# Patient Record
Sex: FEMALE | Race: WHITE
[De-identification: names, ages, dates, MRNs, and addresses within clinical notes are randomized per-mention and may not be internally consistent; named-entity substitution may affect disease eponyms.]

---

## 2018-02-21 ENCOUNTER — HOSPITAL ENCOUNTER (INPATIENT)
Dept: HOSPITAL 54 - ER | Age: 73
LOS: 2 days | Discharge: HOME | DRG: 309 | End: 2018-02-23
Attending: INTERNAL MEDICINE | Admitting: INTERNAL MEDICINE
Payer: MEDICARE

## 2018-02-21 VITALS — DIASTOLIC BLOOD PRESSURE: 64 MMHG | SYSTOLIC BLOOD PRESSURE: 134 MMHG

## 2018-02-21 VITALS — HEIGHT: 62 IN | WEIGHT: 119 LBS | BODY MASS INDEX: 21.9 KG/M2

## 2018-02-21 VITALS — SYSTOLIC BLOOD PRESSURE: 117 MMHG | DIASTOLIC BLOOD PRESSURE: 64 MMHG

## 2018-02-21 DIAGNOSIS — R00.1: ICD-10-CM

## 2018-02-21 DIAGNOSIS — E83.42: ICD-10-CM

## 2018-02-21 DIAGNOSIS — G90.8: ICD-10-CM

## 2018-02-21 DIAGNOSIS — D64.9: ICD-10-CM

## 2018-02-21 DIAGNOSIS — N39.0: ICD-10-CM

## 2018-02-21 DIAGNOSIS — I10: ICD-10-CM

## 2018-02-21 DIAGNOSIS — I48.0: Primary | ICD-10-CM

## 2018-02-21 DIAGNOSIS — E11.9: ICD-10-CM

## 2018-02-21 DIAGNOSIS — E78.5: ICD-10-CM

## 2018-02-21 LAB
ALBUMIN SERPL BCP-MCNC: 3.5 G/DL (ref 3.4–5)
ALP SERPL-CCNC: 64 U/L (ref 46–116)
ALT SERPL W P-5'-P-CCNC: 17 U/L (ref 12–78)
APTT PPP: 20 SEC (ref 23–34)
AST SERPL W P-5'-P-CCNC: 20 U/L (ref 15–37)
BASOPHILS # BLD AUTO: 0.1 /CMM (ref 0–0.2)
BASOPHILS NFR BLD AUTO: 0.8 % (ref 0–2)
BILIRUB DIRECT SERPL-MCNC: 0.1 MG/DL (ref 0–0.2)
BILIRUB SERPL-MCNC: 0.4 MG/DL (ref 0.2–1)
BUN SERPL-MCNC: 15 MG/DL (ref 7–18)
CALCIUM SERPL-MCNC: 9.5 MG/DL (ref 8.5–10.1)
CHLORIDE SERPL-SCNC: 106 MMOL/L (ref 98–107)
CO2 SERPL-SCNC: 25 MMOL/L (ref 21–32)
CREAT SERPL-MCNC: 0.8 MG/DL (ref 0.6–1.3)
D DIMER PPP FEU-MCNC: 1.31 MG/L(FEU (ref 0.17–0.5)
EOSINOPHIL # BLD AUTO: 0.1 /CMM (ref 0–0.7)
EOSINOPHIL NFR BLD AUTO: 0.6 % (ref 0–6)
GLUCOSE SERPL-MCNC: 119 MG/DL (ref 74–106)
HCT VFR BLD AUTO: 41 % (ref 33–45)
HGB BLD-MCNC: 14 G/DL (ref 11.5–14.8)
INR PPP: 0.95 (ref 0.85–1.15)
LYMPHOCYTES NFR BLD AUTO: 4.3 /CMM (ref 0.8–4.8)
LYMPHOCYTES NFR BLD AUTO: 42.4 % (ref 20–44)
MCH RBC QN AUTO: 31 PG (ref 26–33)
MCHC RBC AUTO-ENTMCNC: 34 G/DL (ref 31–36)
MCV RBC AUTO: 90 FL (ref 82–100)
MONOCYTES NFR BLD AUTO: 0.6 /CMM (ref 0.1–1.3)
MONOCYTES NFR BLD AUTO: 5.6 % (ref 2–12)
NEUTROPHILS # BLD AUTO: 5.1 /CMM (ref 1.8–8.9)
NEUTROPHILS NFR BLD AUTO: 50.6 % (ref 43–81)
PH UR STRIP: 5.5 [PH] (ref 5–8)
PLATELET # BLD AUTO: 293 /CMM (ref 150–450)
POTASSIUM SERPL-SCNC: 4.2 MMOL/L (ref 3.5–5.1)
PROT SERPL-MCNC: 7.5 G/DL (ref 6.4–8.2)
RBC # BLD AUTO: 4.59 MIL/UL (ref 4–5.2)
RBC #/AREA URNS HPF: (no result) /HPF (ref 0–2)
RDW COEFFICIENT OF VARIATION: 12.6 (ref 11.5–15)
SODIUM SERPL-SCNC: 139 MMOL/L (ref 136–145)
TROPONIN I SERPL-MCNC: < 0.017 NG/ML (ref 0–0.06)
UROBILINOGEN UR STRIP-MCNC: 0.2 EU/DL
WBC #/AREA URNS HPF: (no result) /HPF (ref 0–3)
WBC NRBC COR # BLD AUTO: 10.2 K/UL (ref 4.3–11)

## 2018-02-21 PROCEDURE — Z7610: HCPCS

## 2018-02-21 PROCEDURE — A4606 OXYGEN PROBE USED W OXIMETER: HCPCS

## 2018-02-21 RX ADMIN — ENOXAPARIN SODIUM SCH MG: 40 INJECTION SUBCUTANEOUS at 22:29

## 2018-02-21 RX ADMIN — SODIUM CHLORIDE PRN MLS/HR: 9 INJECTION, SOLUTION INTRAVENOUS at 22:29

## 2018-02-21 NOTE — NUR
PT AMBULATED WITH STEADY GAIT TO RESTROOM WITH GRANDSON TO GIVE URINE SPECIMEN. 
PT BACK TO BED AND PLACED BACK ON MONITOR AND POX. PT SAFETY AND COMFORT 
MEASURES IN PLACE.

## 2018-02-21 NOTE — NUR
UNABLE TO COMPLETED STANDING PORTION OF ORTHOSTATIC BLOOD PRESSURE D/T LOW BP, 
MD INFORMED AND AGREES TO HOLD OFF FOR NOW.

## 2018-02-21 NOTE — NUR
PATIENT BROUGHT BACK IMMEDIATELY, FAMILY TOLD CT TECH OVER PHONE TO HOLD OFF ON 
CTA UNTIL FAMILY ARRIVES TO BEDSIDE.

## 2018-02-21 NOTE — NUR
ms/rn notes



Received a patient from er, complaint of chest pain. Under epic group. Patient is alert et 
oriented. Respiratione even et unlabored. No sob noted.  Vital signs stable, afebrile. No 
complaints of chest pain . Lungs are clear upon auscultation. skin is intact, No skin 
breakdown noted. ambulates with steady gait. Sinus rhythm on cardiac monitor, Hr- 69. No 
acute distress noted. Oriented too room and call light.  instructed patient to call for 
assist . Verbalize understanding. All safety precautions rendered. Will continue to monitor.

## 2018-02-21 NOTE — NUR
PATIENT PRESENTS TO ER C/O CHEST PAIN SINCE THIS AM. HAD SYNCOPAL EPISODE AT ER 
WAITING ROOM.  PATIENT IS CURRENTLY AWAKE AND ALERT X 3. BREATHING EVEN AND 
UNLABORED. NO SOB, NAD.  PATIENT IS HYPOTENSIVE, ASYMPTOMATIC AT THIS TIME.  
SAFETY AND COMFORT MEASURES IN PLACE. AWAITING MD ORDERS.

## 2018-02-22 VITALS — SYSTOLIC BLOOD PRESSURE: 150 MMHG | DIASTOLIC BLOOD PRESSURE: 75 MMHG

## 2018-02-22 VITALS — DIASTOLIC BLOOD PRESSURE: 64 MMHG | SYSTOLIC BLOOD PRESSURE: 117 MMHG

## 2018-02-22 VITALS — SYSTOLIC BLOOD PRESSURE: 147 MMHG | DIASTOLIC BLOOD PRESSURE: 72 MMHG

## 2018-02-22 VITALS — DIASTOLIC BLOOD PRESSURE: 85 MMHG | SYSTOLIC BLOOD PRESSURE: 141 MMHG

## 2018-02-22 VITALS — DIASTOLIC BLOOD PRESSURE: 76 MMHG | SYSTOLIC BLOOD PRESSURE: 144 MMHG

## 2018-02-22 VITALS — DIASTOLIC BLOOD PRESSURE: 62 MMHG | SYSTOLIC BLOOD PRESSURE: 123 MMHG

## 2018-02-22 LAB
BASOPHILS # BLD AUTO: 0 /CMM (ref 0–0.2)
BASOPHILS NFR BLD AUTO: 0.3 % (ref 0–2)
BUN SERPL-MCNC: 10 MG/DL (ref 7–18)
CALCIUM SERPL-MCNC: 9.1 MG/DL (ref 8.5–10.1)
CHLORIDE SERPL-SCNC: 109 MMOL/L (ref 98–107)
CHOLEST SERPL-MCNC: 248 MG/DL (ref ?–200)
CO2 SERPL-SCNC: 27 MMOL/L (ref 21–32)
CREAT SERPL-MCNC: 0.6 MG/DL (ref 0.6–1.3)
EOSINOPHIL # BLD AUTO: 0.1 /CMM (ref 0–0.7)
EOSINOPHIL NFR BLD AUTO: 0.8 % (ref 0–6)
FERRITIN SERPL-MCNC: 50 NG/ML (ref 8–388)
GLUCOSE SERPL-MCNC: 89 MG/DL (ref 74–106)
HCT VFR BLD AUTO: 34 % (ref 33–45)
HDLC SERPL-MCNC: 44 MG/DL (ref 40–60)
HGB BLD-MCNC: 11.6 G/DL (ref 11.5–14.8)
IRON SERPL-MCNC: 105 UG/DL (ref 50–175)
LDLC SERPL DIRECT ASSAY-MCNC: 176 MG/DL (ref 0–99)
LYMPHOCYTES NFR BLD AUTO: 3.6 /CMM (ref 0.8–4.8)
LYMPHOCYTES NFR BLD AUTO: 43.3 % (ref 20–44)
MAGNESIUM SERPL-MCNC: 1.7 MG/DL (ref 1.8–2.4)
MCH RBC QN AUTO: 32 PG (ref 26–33)
MCHC RBC AUTO-ENTMCNC: 34 G/DL (ref 31–36)
MCV RBC AUTO: 92 FL (ref 82–100)
MONOCYTES NFR BLD AUTO: 0.5 /CMM (ref 0.1–1.3)
MONOCYTES NFR BLD AUTO: 5.9 % (ref 2–12)
NEUTROPHILS # BLD AUTO: 4.1 /CMM (ref 1.8–8.9)
NEUTROPHILS NFR BLD AUTO: 49.7 % (ref 43–81)
NT-PROBNP SERPL-MCNC: 509 PG/ML (ref 0–125)
PHOSPHATE SERPL-MCNC: 3.9 MG/DL (ref 2.5–4.9)
PLATELET # BLD AUTO: 230 /CMM (ref 150–450)
POTASSIUM SERPL-SCNC: 4.5 MMOL/L (ref 3.5–5.1)
RBC # BLD AUTO: 3.65 MIL/UL (ref 4–5.2)
RDW COEFFICIENT OF VARIATION: 13.7 (ref 11.5–15)
SODIUM SERPL-SCNC: 142 MMOL/L (ref 136–145)
TIBC SERPL-MCNC: 259 UG/DL (ref 250–450)
TRIGL SERPL-MCNC: 146 MG/DL (ref 30–150)
TSH SERPL DL<=0.005 MIU/L-ACNC: 3.27 UIU/ML (ref 0.36–3.74)
TSH SERPL DL<=0.005 MIU/L-ACNC: 3.52 UIU/ML (ref 0.36–3.74)
WBC NRBC COR # BLD AUTO: 8.3 K/UL (ref 4.3–11)

## 2018-02-22 RX ADMIN — EZETIMIBE SCH MG: 10 TABLET ORAL at 12:12

## 2018-02-22 RX ADMIN — SODIUM CHLORIDE PRN MLS/HR: 9 INJECTION, SOLUTION INTRAVENOUS at 12:28

## 2018-02-22 RX ADMIN — PANTOPRAZOLE SODIUM SCH MG: 40 TABLET, DELAYED RELEASE ORAL at 12:13

## 2018-02-22 RX ADMIN — MAGNESIUM SULFATE IN DEXTROSE SCH MLS/HR: 10 INJECTION, SOLUTION INTRAVENOUS at 12:12

## 2018-02-22 RX ADMIN — LISINOPRIL SCH MG: 10 TABLET ORAL at 12:12

## 2018-02-22 RX ADMIN — MAGNESIUM SULFATE IN DEXTROSE SCH MLS/HR: 10 INJECTION, SOLUTION INTRAVENOUS at 13:58

## 2018-02-22 RX ADMIN — Medication SCH EACH: at 13:58

## 2018-02-22 RX ADMIN — ENOXAPARIN SODIUM SCH MG: 40 INJECTION SUBCUTANEOUS at 22:17

## 2018-02-22 RX ADMIN — ASPIRIN 81 MG SCH MG: 81 TABLET ORAL at 12:12

## 2018-02-22 NOTE — NUR
MS RN INITIAL NOTES



PT IN BED, ALERT ET ORIENTED. RESPIRATIONS EVEN ET UNLABORED. NO SOB NOTED.   COMFORTABLY, 
NO APPARENT DISTRESS NOTED. NO COMPLAINTS OF DISCOMFORT. ADMINISTERED . WITH IVF INFUSING 
WELL. SINUS RHYTHM ON CARDIAC MONITOR, HR- 60.   NO INFILTRATION NOTED. CALL LIGHT WITHIN 
REACH. KEPT CLEAN AND COMFORTABLE,  NEEDS ALL ATTENDED. . WILL CONT TO MONITOR.

## 2018-02-22 NOTE — NUR
MS RN CLOSING NOTES



PT IN BED, ALERT ET ORIENTED. RESPIRATIONS EVEN ET UNLABORED. NO SOB NOTED.  VITAL SIGNS 
STABLE, AFEBRILE.RESTING COMFORTABLY, NO APPARENT DISTRESS NOTED. NO COMPLAINTS CHEST PAIN 
THE ENTIRE SHIFT. ADMINISTERED ALL MEDS AS ORDERED. WITH IVF INFUSING WELL. SINUS RHYTHM ON 
CARDIAC MONITOR, HR- 67.   NO INFILTRATION NOTED. CALL LIGHT WITHIN REACH. KEPT CLEAN AND 
COMFORTABLE,  NEEDS ALL ATTENDED. . WILL ENDORSE ACCORDINGLY FOR CONTINUITY OF CARE.

## 2018-02-23 VITALS — SYSTOLIC BLOOD PRESSURE: 132 MMHG | DIASTOLIC BLOOD PRESSURE: 85 MMHG

## 2018-02-23 VITALS — SYSTOLIC BLOOD PRESSURE: 132 MMHG | DIASTOLIC BLOOD PRESSURE: 83 MMHG

## 2018-02-23 VITALS — SYSTOLIC BLOOD PRESSURE: 131 MMHG | DIASTOLIC BLOOD PRESSURE: 80 MMHG

## 2018-02-23 VITALS — DIASTOLIC BLOOD PRESSURE: 85 MMHG | SYSTOLIC BLOOD PRESSURE: 141 MMHG

## 2018-02-23 LAB
ALBUMIN SERPL BCP-MCNC: 3.3 G/DL (ref 3.4–5)
ALP SERPL-CCNC: 53 U/L (ref 46–116)
ALT SERPL W P-5'-P-CCNC: 20 U/L (ref 12–78)
AST SERPL W P-5'-P-CCNC: 21 U/L (ref 15–37)
BASOPHILS # BLD AUTO: 0 /CMM (ref 0–0.2)
BASOPHILS NFR BLD AUTO: 0.4 % (ref 0–2)
BILIRUB SERPL-MCNC: 0.4 MG/DL (ref 0.2–1)
BUN SERPL-MCNC: 13 MG/DL (ref 7–18)
CALCIUM SERPL-MCNC: 8.6 MG/DL (ref 8.5–10.1)
CHLORIDE SERPL-SCNC: 106 MMOL/L (ref 98–107)
CO2 SERPL-SCNC: 27 MMOL/L (ref 21–32)
CREAT SERPL-MCNC: 0.7 MG/DL (ref 0.6–1.3)
EOSINOPHIL # BLD AUTO: 0.1 /CMM (ref 0–0.7)
EOSINOPHIL NFR BLD AUTO: 1.4 % (ref 0–6)
GLUCOSE SERPL-MCNC: 82 MG/DL (ref 74–106)
HCT VFR BLD AUTO: 34 % (ref 33–45)
HGB BLD-MCNC: 11.7 G/DL (ref 11.5–14.8)
LYMPHOCYTES NFR BLD AUTO: 3.4 /CMM (ref 0.8–4.8)
LYMPHOCYTES NFR BLD AUTO: 42.4 % (ref 20–44)
MAGNESIUM SERPL-MCNC: 1.8 MG/DL (ref 1.8–2.4)
MCH RBC QN AUTO: 32 PG (ref 26–33)
MCHC RBC AUTO-ENTMCNC: 34 G/DL (ref 31–36)
MCV RBC AUTO: 93 FL (ref 82–100)
MONOCYTES NFR BLD AUTO: 0.5 /CMM (ref 0.1–1.3)
MONOCYTES NFR BLD AUTO: 6.1 % (ref 2–12)
NEUTROPHILS # BLD AUTO: 4 /CMM (ref 1.8–8.9)
NEUTROPHILS NFR BLD AUTO: 49.7 % (ref 43–81)
PHOSPHATE SERPL-MCNC: 4.2 MG/DL (ref 2.5–4.9)
PLATELET # BLD AUTO: 220 /CMM (ref 150–450)
POTASSIUM SERPL-SCNC: 3.9 MMOL/L (ref 3.5–5.1)
PROT SERPL-MCNC: 7 G/DL (ref 6.4–8.2)
RBC # BLD AUTO: 3.68 MIL/UL (ref 4–5.2)
RDW COEFFICIENT OF VARIATION: 13.2 (ref 11.5–15)
SODIUM SERPL-SCNC: 141 MMOL/L (ref 136–145)
TROPONIN I SERPL-MCNC: < 0.017 NG/ML (ref 0–0.06)
WBC NRBC COR # BLD AUTO: 8 K/UL (ref 4.3–11)

## 2018-02-23 RX ADMIN — SODIUM CHLORIDE PRN MLS/HR: 9 INJECTION, SOLUTION INTRAVENOUS at 05:55

## 2018-02-23 RX ADMIN — EZETIMIBE SCH MG: 10 TABLET ORAL at 08:28

## 2018-02-23 RX ADMIN — PANTOPRAZOLE SODIUM SCH MG: 40 TABLET, DELAYED RELEASE ORAL at 08:27

## 2018-02-23 RX ADMIN — ASPIRIN 81 MG SCH MG: 81 TABLET ORAL at 08:27

## 2018-02-23 RX ADMIN — LISINOPRIL SCH MG: 10 TABLET ORAL at 08:28

## 2018-02-23 RX ADMIN — Medication SCH EACH: at 08:39

## 2018-02-23 NOTE — NUR
RN NOTES 

RECEIVED PT ON BED, A/Ox4, RESPIRATION EVEN AND UNLABORED, ON RA , NO SOB NOTED, ON TELE SR 
HR IN 80'S , NS AT 75CC/HR RUNNING VIA L HAND IV SITE G 20 , NO COMPLICATION NOTED AT THE IV 
 SITE, PT DENVER ANY CHEST PAIN AT THIS TIME , SR UP x3, CALL LIGHT WITHIN EASY REACH, BED 
LOCKED AND IN LOWEST POSITION , CONTINUE TO MONITOR.

## 2018-02-23 NOTE — NUR
RN NOTES 

DISCHARGE INSTRUCTION GIVEN TO PT AND HER DAUGHTER , TOSHIA , VERBALIZED UNDERSTANDING,  
VSS STABLE , H/L REMOVED , NO SKIN INSURE NOTED, PT LEFT THE FLOOR  VIA W/C TO MAIN ENTRANCE 
ACCOMPANIED BY STAFF MEMBER  IN STABLE CONDITION .

## 2018-02-23 NOTE — NUR
MS RN CLOSING NOTES



PT IN BED, ALERT ET ORIENTED. RESPIRATIONS EVEN ET UNLABORED. NO SOB NOTED.   COMFORTABLY, 
NO APPARENT DISTRESS NOTED. NO COMPLAINTS OF DISCOMFORT. ADMINISTERED . WITH IVF INFUSING 
WELL. SINUS RHYTHM ON CARDIAC MONITOR, HR- 60.   NO INFILTRATION NOTED. CALL LIGHT WITHIN 
REACH. KEPT CLEAN AND COMFORTABLE,  NEEDS ALL ATTENDED. . WILL CONT TO MONITOR.

## 2018-07-10 ENCOUNTER — HOSPITAL ENCOUNTER (EMERGENCY)
Dept: HOSPITAL 54 - ER | Age: 73
Discharge: HOME | End: 2018-07-10
Payer: MEDICARE

## 2018-07-10 VITALS — HEIGHT: 64 IN | BODY MASS INDEX: 29.02 KG/M2 | WEIGHT: 170 LBS

## 2018-07-10 VITALS — DIASTOLIC BLOOD PRESSURE: 18 MMHG | SYSTOLIC BLOOD PRESSURE: 105 MMHG

## 2018-07-10 DIAGNOSIS — Z79.82: ICD-10-CM
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
## 2018-07-10 DIAGNOSIS — Z79.899: ICD-10-CM

## 2018-07-10 DIAGNOSIS — I48.91: ICD-10-CM
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
## 2018-07-10 DIAGNOSIS — I10: ICD-10-CM

## 2018-07-10 DIAGNOSIS — S30.0XXA: Primary | ICD-10-CM

## 2018-07-10 DIAGNOSIS — Z79.84: ICD-10-CM

## 2018-07-10 DIAGNOSIS — E11.9: ICD-10-CM
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
## 2018-07-10 PROCEDURE — 72170 X-RAY EXAM OF PELVIS: CPT

## 2018-07-10 PROCEDURE — 99283 EMERGENCY DEPT VISIT LOW MDM: CPT

## 2018-07-10 PROCEDURE — Z7610: HCPCS

## 2018-07-10 PROCEDURE — A4606 OXYGEN PROBE USED W OXIMETER: HCPCS

## 2018-07-10 NOTE — NUR
Patient discharged to home in stable condition. Written and verbal after care 
instructions given. Patient verbalizes understanding of instruction. PT 
AMBULATED OUT WITH A STEADY GAIT. VSS

## 2019-04-10 ENCOUNTER — OFFICE (OUTPATIENT)
Dept: URBAN - METROPOLITAN AREA CLINIC 57 | Facility: CLINIC | Age: 74
End: 2019-04-10

## 2019-04-10 VITALS
SYSTOLIC BLOOD PRESSURE: 132 MMHG | TEMPERATURE: 98.7 F | HEIGHT: 65 IN | HEART RATE: 79 BPM | WEIGHT: 189 LBS | DIASTOLIC BLOOD PRESSURE: 85 MMHG | RESPIRATION RATE: 46 BRPM

## 2019-04-10 DIAGNOSIS — E78.5 HYPERLIPIDEMIA, UNSPECIFIED: ICD-10-CM

## 2019-04-10 DIAGNOSIS — E11.9 TYPE 2 DIABETES MELLITUS: ICD-10-CM

## 2019-04-10 DIAGNOSIS — I10 HYPERTENSION: ICD-10-CM

## 2019-04-10 DIAGNOSIS — Z12.11 SCREENING FOR COLON CANCER: ICD-10-CM

## 2019-04-10 DIAGNOSIS — K59.09 CHRONIC CONSTIPATION: ICD-10-CM

## 2019-04-10 PROCEDURE — 82272 OCCULT BLD FECES 1-3 TESTS: CPT | Performed by: INTERNAL MEDICINE

## 2019-04-10 PROCEDURE — 99204 OFFICE O/P NEW MOD 45 MIN: CPT | Performed by: INTERNAL MEDICINE

## 2019-04-10 RX ORDER — LINACLOTIDE 72 UG/1
72 CAPSULE, GELATIN COATED ORAL
Qty: 30 | Status: ACTIVE

## 2019-04-10 NOTE — SERVICEHPINOTES
The patient is a 73-year-old woman who comes in today accompanied by her daughter. The reason for her visit is constipation. The patient's native language is [place]ian and she speaks only very little English. Her daughter acts as . Nonetheless she suffers from severe constipation. She does not remember the laxatives which she has tried. Nonetheless she was recently given Linzess 72 µg. When she takes this intermittently it helps. However if she takes daily it does not seem to help. She has not had any recent colonoscopy. She did have recently a CBC, chem panel and TSH all of which were normal. She denies any rectal bleeding, melena, diarrhea, fever, chills, nausea or vomiting. She describes her stools as [place] stool scale #1. She does suffer from allergic rhinitis, back pain, diabetes, reflux, hyperlipidemia, hypertension and migraines for which she takes a number of medications.

## 2019-04-30 ENCOUNTER — AMBULATORY SURGICAL CENTER (OUTPATIENT)
Dept: URBAN - METROPOLITAN AREA SURGERY 34 | Facility: SURGERY | Age: 74
End: 2019-04-30

## 2019-04-30 VITALS
HEIGHT: 65 IN | HEART RATE: 53 BPM | TEMPERATURE: 98.9 F | RESPIRATION RATE: 16 BRPM | DIASTOLIC BLOOD PRESSURE: 73 MMHG | SYSTOLIC BLOOD PRESSURE: 138 MMHG | OXYGEN SATURATION: 97 % | WEIGHT: 180 LBS

## 2019-04-30 DIAGNOSIS — D12.5 BENIGN NEOPLASM OF SIGMOID COLON: ICD-10-CM

## 2019-04-30 DIAGNOSIS — K57.30 DVRTCLOS OF LG INT W/O PERFORATION OR ABSCESS W/O BLEEDING: ICD-10-CM

## 2019-04-30 DIAGNOSIS — D12.0 BENIGN NEOPLASM OF CECUM: ICD-10-CM

## 2019-04-30 PROCEDURE — 45385 COLONOSCOPY W/LESION REMOVAL: CPT | Performed by: INTERNAL MEDICINE

## 2019-04-30 PROCEDURE — 45380 COLONOSCOPY AND BIOPSY: CPT | Mod: 59 | Performed by: INTERNAL MEDICINE

## 2023-04-21 ENCOUNTER — HOSPITAL ENCOUNTER (EMERGENCY)
Dept: HOSPITAL 54 - ER | Age: 78
Discharge: HOME | End: 2023-04-21
Payer: MEDICARE

## 2023-04-21 VITALS — DIASTOLIC BLOOD PRESSURE: 73 MMHG | SYSTOLIC BLOOD PRESSURE: 145 MMHG

## 2023-04-21 VITALS — HEIGHT: 65 IN | BODY MASS INDEX: 29.99 KG/M2 | WEIGHT: 180 LBS

## 2023-04-21 DIAGNOSIS — Z79.82: ICD-10-CM

## 2023-04-21 DIAGNOSIS — E11.9: ICD-10-CM

## 2023-04-21 DIAGNOSIS — R42: Primary | ICD-10-CM

## 2023-04-21 DIAGNOSIS — Z79.899: ICD-10-CM

## 2023-04-21 DIAGNOSIS — I48.91: ICD-10-CM

## 2023-04-21 DIAGNOSIS — I10: ICD-10-CM

## 2023-04-21 DIAGNOSIS — Z98.890: ICD-10-CM

## 2023-04-21 LAB
ALBUMIN SERPL BCP-MCNC: 3.8 G/DL (ref 3.4–5)
ALP SERPL-CCNC: 54 U/L (ref 46–116)
ALT SERPL W P-5'-P-CCNC: 14 U/L (ref 12–78)
AST SERPL W P-5'-P-CCNC: 23 U/L (ref 15–37)
BASOPHILS # BLD AUTO: 0 K/UL (ref 0–0.2)
BASOPHILS NFR BLD AUTO: 0.5 % (ref 0–2)
BILIRUB DIRECT SERPL-MCNC: 0.1 MG/DL (ref 0–0.2)
BILIRUB SERPL-MCNC: 0.4 MG/DL (ref 0.2–1)
BUN SERPL-MCNC: 16 MG/DL (ref 7–18)
CALCIUM SERPL-MCNC: 9.6 MG/DL (ref 8.5–10.1)
CHLORIDE SERPL-SCNC: 104 MMOL/L (ref 98–107)
CO2 SERPL-SCNC: 28 MMOL/L (ref 21–32)
CREAT SERPL-MCNC: 0.6 MG/DL (ref 0.6–1.3)
EOSINOPHIL NFR BLD AUTO: 1.1 % (ref 0–6)
GLUCOSE SERPL-MCNC: 99 MG/DL (ref 74–106)
HCT VFR BLD AUTO: 38 % (ref 33–45)
HGB BLD-MCNC: 12.7 G/DL (ref 11.5–14.8)
LYMPHOCYTES NFR BLD AUTO: 2.7 K/UL (ref 0.8–4.8)
LYMPHOCYTES NFR BLD AUTO: 37.4 % (ref 20–44)
MCHC RBC AUTO-ENTMCNC: 33 G/DL (ref 31–36)
MCV RBC AUTO: 92 FL (ref 82–100)
MONOCYTES NFR BLD AUTO: 0.4 K/UL (ref 0.1–1.3)
MONOCYTES NFR BLD AUTO: 6 % (ref 2–12)
NEUTROPHILS # BLD AUTO: 3.9 K/UL (ref 1.8–8.9)
NEUTROPHILS NFR BLD AUTO: 55 % (ref 43–81)
PLATELET # BLD AUTO: 240 K/UL (ref 150–450)
POTASSIUM SERPL-SCNC: 4.1 MMOL/L (ref 3.5–5.1)
PROT SERPL-MCNC: 7.7 G/DL (ref 6.4–8.2)
RBC # BLD AUTO: 4.16 MIL/UL (ref 4–5.2)
SODIUM SERPL-SCNC: 137 MMOL/L (ref 136–145)
WBC NRBC COR # BLD AUTO: 7.1 K/UL (ref 4.3–11)

## 2023-04-21 PROCEDURE — 84484 ASSAY OF TROPONIN QUANT: CPT

## 2023-04-21 PROCEDURE — 80048 BASIC METABOLIC PNL TOTAL CA: CPT

## 2023-04-21 PROCEDURE — 82962 GLUCOSE BLOOD TEST: CPT

## 2023-04-21 PROCEDURE — 93005 ELECTROCARDIOGRAM TRACING: CPT

## 2023-04-21 PROCEDURE — 83880 ASSAY OF NATRIURETIC PEPTIDE: CPT

## 2023-04-21 PROCEDURE — 36415 COLL VENOUS BLD VENIPUNCTURE: CPT

## 2023-04-21 PROCEDURE — 80076 HEPATIC FUNCTION PANEL: CPT

## 2023-04-21 PROCEDURE — 71045 X-RAY EXAM CHEST 1 VIEW: CPT

## 2023-04-21 PROCEDURE — 99285 EMERGENCY DEPT VISIT HI MDM: CPT

## 2023-04-21 PROCEDURE — 85025 COMPLETE CBC W/AUTO DIFF WBC: CPT

## 2023-04-21 PROCEDURE — 70450 CT HEAD/BRAIN W/O DYE: CPT

## 2023-04-21 NOTE — NUR
RECEIVED PT 77YRS FEMALE CAME FROM HOME BY MARILEE C/O DIZZNESS AND WEAKNESS NO 
weekness no diffecity

## 2023-11-13 ENCOUNTER — HOSPITAL ENCOUNTER (INPATIENT)
Dept: HOSPITAL 12 - ER | Age: 78
LOS: 2 days | Discharge: HOME | DRG: 310 | End: 2023-11-15
Admitting: INTERNAL MEDICINE
Payer: MEDICARE

## 2023-11-13 VITALS — SYSTOLIC BLOOD PRESSURE: 129 MMHG | TEMPERATURE: 98.1 F | OXYGEN SATURATION: 98 % | DIASTOLIC BLOOD PRESSURE: 79 MMHG

## 2023-11-13 VITALS — WEIGHT: 177 LBS | BODY MASS INDEX: 32.57 KG/M2 | HEIGHT: 62 IN

## 2023-11-13 DIAGNOSIS — I50.9: ICD-10-CM

## 2023-11-13 DIAGNOSIS — E66.9: ICD-10-CM

## 2023-11-13 DIAGNOSIS — I35.0: ICD-10-CM

## 2023-11-13 DIAGNOSIS — I48.0: Primary | ICD-10-CM

## 2023-11-13 DIAGNOSIS — E78.5: ICD-10-CM

## 2023-11-13 DIAGNOSIS — M19.90: ICD-10-CM

## 2023-11-13 DIAGNOSIS — R94.6: ICD-10-CM

## 2023-11-13 DIAGNOSIS — Z90.710: ICD-10-CM

## 2023-11-13 DIAGNOSIS — I25.10: ICD-10-CM

## 2023-11-13 DIAGNOSIS — I11.0: ICD-10-CM

## 2023-11-13 DIAGNOSIS — E11.9: ICD-10-CM

## 2023-11-13 LAB
ALBUMIN SERPL BCG-MCNC: 3.7 G/DL (ref 3.4–5)
ALP SERPL-CCNC: 62 U/L (ref 50–136)
ALT SERPL W/O P-5'-P-CCNC: 31 U/L (ref 14–59)
AST SERPL-CCNC: 22 U/L (ref 15–37)
BASOPHILS # BLD AUTO: 0.1 K/UL (ref 0–0.2)
BASOPHILS NFR BLD AUTO: 0.6 % (ref 0–2)
BILIRUB DIRECT SERPL-MCNC: 0.1 MG/DL (ref 0–0.2)
BILIRUB SERPL-MCNC: 0.2 MG/DL (ref 0.2–1)
BUN SERPL-MCNC: 23 MG/DL (ref 7–18)
CALCIUM SERPL-MCNC: 9.4 MG/DL (ref 8.5–10.1)
CHLORIDE SERPL-SCNC: 105 MMOL/L (ref 98–107)
CO2 SERPL-SCNC: 22 MMOL/L (ref 21–32)
CREAT SERPL-MCNC: 0.7 MG/DL (ref 0.6–1.3)
EOSINOPHIL # BLD AUTO: 0.1 K/UL (ref 0–0.7)
EOSINOPHIL NFR BLD AUTO: 0.6 % (ref 0–7)
ERYTHROCYTE [DISTWIDTH] IN BLOOD BY AUTOMATED COUNT: 14.1 % (ref 12.3–17.7)
GLUCOSE SERPL-MCNC: 116 MG/DL (ref 74–106)
HCT VFR BLD AUTO: 36.3 % (ref 31.2–41.9)
HGB BLD-MCNC: 12.4 G/DL (ref 10.9–14.3)
LYMPHOCYTES # BLD AUTO: 4.6 K/UL (ref 0.8–4.8)
LYMPHOCYTES NFR BLD AUTO: 45.5 % (ref 20.5–51.5)
MANUAL DIF COMMENT BLD-IMP: 1
MCH RBC QN AUTO: 32.2 UUG (ref 24.7–32.8)
MCHC RBC AUTO-ENTMCNC: 34 G/DL (ref 32.3–35.6)
MCV RBC AUTO: 94.1 FL (ref 75.5–95.3)
MONOCYTES # BLD AUTO: 0.7 K/UL (ref 0.1–1.3)
MONOCYTES NFR BLD AUTO: 6.6 % (ref 0–11)
NEUTROPHILS # BLD AUTO: 4.7 K/UL (ref 1.8–8.9)
NEUTROPHILS NFR BLD AUTO: 46.7 % (ref 38.5–71.5)
NT-PROBNP SERPL-MCNC: 2784 PG/ML (ref 0–125)
PLATELET # BLD AUTO: 240 K/UL (ref 179–408)
POTASSIUM SERPL-SCNC: 4.1 MMOL/L (ref 3.5–5.1)
RBC # BLD AUTO: 3.86 MIL/UL (ref 3.63–4.92)
SODIUM SERPL-SCNC: 141 MMOL/L (ref 136–145)
WBC # BLD AUTO: 10.1 K/UL (ref 3.8–11.8)
WS STN SPEC: 7.3 G/DL (ref 6.4–8.2)

## 2023-11-13 PROCEDURE — A4663 DIALYSIS BLOOD PRESSURE CUFF: HCPCS

## 2023-11-13 PROCEDURE — G0378 HOSPITAL OBSERVATION PER HR: HCPCS

## 2023-11-13 PROCEDURE — A4606 OXYGEN PROBE USED W OXIMETER: HCPCS

## 2023-11-13 RX ADMIN — DILTIAZEM HYDROCHLORIDE SCH MG: 30 TABLET, FILM COATED ORAL at 23:06

## 2023-11-13 RX ADMIN — APIXABAN SCH MG: 5 TABLET, FILM COATED ORAL at 21:56

## 2023-11-14 VITALS — DIASTOLIC BLOOD PRESSURE: 72 MMHG | OXYGEN SATURATION: 95 % | TEMPERATURE: 98 F | SYSTOLIC BLOOD PRESSURE: 128 MMHG

## 2023-11-14 VITALS — TEMPERATURE: 98.1 F | DIASTOLIC BLOOD PRESSURE: 57 MMHG | SYSTOLIC BLOOD PRESSURE: 115 MMHG | OXYGEN SATURATION: 97 %

## 2023-11-14 VITALS — SYSTOLIC BLOOD PRESSURE: 112 MMHG | TEMPERATURE: 98.1 F | DIASTOLIC BLOOD PRESSURE: 64 MMHG | OXYGEN SATURATION: 95 %

## 2023-11-14 VITALS — OXYGEN SATURATION: 98 % | TEMPERATURE: 97.5 F | SYSTOLIC BLOOD PRESSURE: 120 MMHG | DIASTOLIC BLOOD PRESSURE: 66 MMHG

## 2023-11-14 VITALS — TEMPERATURE: 98.5 F

## 2023-11-14 LAB
BASOPHILS # BLD AUTO: 0 K/UL (ref 0–0.2)
BASOPHILS NFR BLD AUTO: 0.4 % (ref 0–2)
BUN SERPL-MCNC: 16 MG/DL (ref 7–18)
CALCIUM SERPL-MCNC: 9.3 MG/DL (ref 8.5–10.1)
CHLORIDE SERPL-SCNC: 105 MMOL/L (ref 98–107)
CHOLEST SERPL-MCNC: 301 MG/DL (ref ?–200)
CO2 SERPL-SCNC: 29 MMOL/L (ref 21–32)
CREAT SERPL-MCNC: 0.7 MG/DL (ref 0.6–1.3)
EOSINOPHIL # BLD AUTO: 0.1 K/UL (ref 0–0.7)
EOSINOPHIL NFR BLD AUTO: 1.5 % (ref 0–7)
ERYTHROCYTE [DISTWIDTH] IN BLOOD BY AUTOMATED COUNT: 13.8 % (ref 12.3–17.7)
GLUCOSE SERPL-MCNC: 97 MG/DL (ref 74–106)
HCT VFR BLD AUTO: 36.6 % (ref 31.2–41.9)
HDLC SERPL-MCNC: 46 MG/DL (ref 40–60)
HGB BLD-MCNC: 12.5 G/DL (ref 10.9–14.3)
LYMPHOCYTES # BLD AUTO: 3.3 K/UL (ref 0.8–4.8)
LYMPHOCYTES NFR BLD AUTO: 46.9 % (ref 20.5–51.5)
MAGNESIUM SERPL-MCNC: 1.8 MG/DL (ref 1.8–2.4)
MANUAL DIF COMMENT BLD-IMP: 1
MCH RBC QN AUTO: 32.3 UUG (ref 24.7–32.8)
MCHC RBC AUTO-ENTMCNC: 34 G/DL (ref 32.3–35.6)
MCV RBC AUTO: 94.4 FL (ref 75.5–95.3)
MONOCYTES # BLD AUTO: 0.4 K/UL (ref 0.1–1.3)
MONOCYTES NFR BLD AUTO: 6.1 % (ref 0–11)
NEUTROPHILS # BLD AUTO: 3.2 K/UL (ref 1.8–8.9)
NEUTROPHILS NFR BLD AUTO: 45.1 % (ref 38.5–71.5)
PHOSPHATE SERPL-MCNC: 3.6 MG/DL (ref 2.5–4.9)
PLATELET # BLD AUTO: 221 K/UL (ref 179–408)
POTASSIUM SERPL-SCNC: 4.2 MMOL/L (ref 3.5–5.1)
RBC # BLD AUTO: 3.88 MIL/UL (ref 3.63–4.92)
SODIUM SERPL-SCNC: 140 MMOL/L (ref 136–145)
TRIGL SERPL-MCNC: 219 MG/DL (ref 30–150)
TSH SERPL DL<=0.005 MIU/L-ACNC: 5.2 MIU/ML (ref 0.36–3.74)
WBC # BLD AUTO: 7.1 K/UL (ref 3.8–11.8)

## 2023-11-14 RX ADMIN — DILTIAZEM HYDROCHLORIDE SCH MG: 60 TABLET, FILM COATED ORAL at 08:36

## 2023-11-14 RX ADMIN — DILTIAZEM HYDROCHLORIDE SCH MG: 60 TABLET, FILM COATED ORAL at 17:02

## 2023-11-14 RX ADMIN — APIXABAN SCH MG: 5 TABLET, FILM COATED ORAL at 08:33

## 2023-11-14 RX ADMIN — DILTIAZEM HYDROCHLORIDE SCH MG: 60 TABLET, FILM COATED ORAL at 12:50

## 2023-11-14 RX ADMIN — PANTOPRAZOLE SODIUM SCH MG: 40 TABLET, DELAYED RELEASE ORAL at 06:05

## 2023-11-14 RX ADMIN — DILTIAZEM HYDROCHLORIDE SCH MG: 30 TABLET, FILM COATED ORAL at 05:25

## 2023-11-14 RX ADMIN — DILTIAZEM HYDROCHLORIDE SCH MG: 60 TABLET, FILM COATED ORAL at 08:30

## 2023-11-14 RX ADMIN — APIXABAN SCH MG: 5 TABLET, FILM COATED ORAL at 20:33

## 2023-11-15 VITALS — TEMPERATURE: 98.2 F | OXYGEN SATURATION: 97 % | SYSTOLIC BLOOD PRESSURE: 111 MMHG | DIASTOLIC BLOOD PRESSURE: 61 MMHG

## 2023-11-15 VITALS — SYSTOLIC BLOOD PRESSURE: 105 MMHG | OXYGEN SATURATION: 97 % | TEMPERATURE: 97.5 F | DIASTOLIC BLOOD PRESSURE: 62 MMHG

## 2023-11-15 LAB
BASOPHILS # BLD AUTO: 0.1 K/UL (ref 0–0.2)
BASOPHILS NFR BLD AUTO: 0.8 % (ref 0–2)
BUN SERPL-MCNC: 21 MG/DL (ref 7–18)
CALCIUM SERPL-MCNC: 9.6 MG/DL (ref 8.5–10.1)
CHLORIDE SERPL-SCNC: 105 MMOL/L (ref 98–107)
CO2 SERPL-SCNC: 26 MMOL/L (ref 21–32)
CREAT SERPL-MCNC: 0.7 MG/DL (ref 0.6–1.3)
EOSINOPHIL # BLD AUTO: 0.1 K/UL (ref 0–0.7)
EOSINOPHIL NFR BLD AUTO: 1.2 % (ref 0–7)
ERYTHROCYTE [DISTWIDTH] IN BLOOD BY AUTOMATED COUNT: 13.8 % (ref 12.3–17.7)
GLUCOSE SERPL-MCNC: 96 MG/DL (ref 74–106)
HCT VFR BLD AUTO: 37.6 % (ref 31.2–41.9)
HGB BLD-MCNC: 12.9 G/DL (ref 10.9–14.3)
LYMPHOCYTES # BLD AUTO: 3.4 K/UL (ref 0.8–4.8)
LYMPHOCYTES NFR BLD AUTO: 46.2 % (ref 20.5–51.5)
MANUAL DIF COMMENT BLD-IMP: 1
MCH RBC QN AUTO: 32.1 UUG (ref 24.7–32.8)
MCHC RBC AUTO-ENTMCNC: 34 G/DL (ref 32.3–35.6)
MCV RBC AUTO: 93.9 FL (ref 75.5–95.3)
MONOCYTES # BLD AUTO: 0.5 K/UL (ref 0.1–1.3)
MONOCYTES NFR BLD AUTO: 7 % (ref 0–11)
NEUTROPHILS # BLD AUTO: 3.3 K/UL (ref 1.8–8.9)
NEUTROPHILS NFR BLD AUTO: 44.8 % (ref 38.5–71.5)
PLATELET # BLD AUTO: 232 K/UL (ref 179–408)
POTASSIUM SERPL-SCNC: 4.1 MMOL/L (ref 3.5–5.1)
RBC # BLD AUTO: 4.01 MIL/UL (ref 3.63–4.92)
SODIUM SERPL-SCNC: 139 MMOL/L (ref 136–145)
WBC # BLD AUTO: 7.4 K/UL (ref 3.8–11.8)

## 2023-11-15 PROCEDURE — 5A2204Z RESTORATION OF CARDIAC RHYTHM, SINGLE: ICD-10-PCS

## 2023-11-15 RX ADMIN — DOCUSATE SODIUM SCH MG: 100 CAPSULE, LIQUID FILLED ORAL at 08:27

## 2023-11-15 RX ADMIN — APIXABAN SCH MG: 5 TABLET, FILM COATED ORAL at 08:29

## 2023-11-15 RX ADMIN — DILTIAZEM HYDROCHLORIDE SCH MG: 60 TABLET, FILM COATED ORAL at 00:00

## 2023-11-15 RX ADMIN — GLIMEPIRIDE SCH MG: 2 TABLET ORAL at 08:27

## 2023-11-15 RX ADMIN — DOCUSATE SODIUM SCH MG: 100 CAPSULE, LIQUID FILLED ORAL at 08:32

## 2023-11-15 RX ADMIN — DILTIAZEM HYDROCHLORIDE SCH MG: 60 TABLET, FILM COATED ORAL at 06:07

## 2023-11-15 RX ADMIN — GLIMEPIRIDE SCH MG: 2 TABLET ORAL at 08:32

## 2023-11-15 RX ADMIN — PANTOPRAZOLE SODIUM SCH MG: 40 TABLET, DELAYED RELEASE ORAL at 06:06
